# Patient Record
Sex: MALE | Race: WHITE | NOT HISPANIC OR LATINO | Employment: FULL TIME | URBAN - METROPOLITAN AREA
[De-identification: names, ages, dates, MRNs, and addresses within clinical notes are randomized per-mention and may not be internally consistent; named-entity substitution may affect disease eponyms.]

---

## 2018-01-28 ENCOUNTER — OFFICE VISIT (OUTPATIENT)
Dept: URGENT CARE | Facility: CLINIC | Age: 58
End: 2018-01-28
Payer: COMMERCIAL

## 2018-01-28 VITALS
SYSTOLIC BLOOD PRESSURE: 140 MMHG | OXYGEN SATURATION: 99 % | BODY MASS INDEX: 28.04 KG/M2 | HEIGHT: 68 IN | WEIGHT: 185 LBS | HEART RATE: 80 BPM | DIASTOLIC BLOOD PRESSURE: 82 MMHG | TEMPERATURE: 97.8 F | RESPIRATION RATE: 20 BRPM

## 2018-01-28 DIAGNOSIS — H60.501 ACUTE NON-INFECTIVE OTITIS EXTERNA OF RIGHT EAR, UNSPECIFIED TYPE: ICD-10-CM

## 2018-01-28 DIAGNOSIS — H61.21 IMPACTED CERUMEN OF RIGHT EAR: Primary | ICD-10-CM

## 2018-01-28 PROCEDURE — S9083 URGENT CARE CENTER GLOBAL: HCPCS | Performed by: FAMILY MEDICINE

## 2018-01-28 PROCEDURE — G0382 LEV 3 HOSP TYPE B ED VISIT: HCPCS | Performed by: FAMILY MEDICINE

## 2018-01-28 PROCEDURE — 69209 REMOVE IMPACTED EAR WAX UNI: CPT | Performed by: FAMILY MEDICINE

## 2018-01-28 RX ORDER — LISINOPRIL 40 MG/1
40 TABLET ORAL DAILY
COMMUNITY

## 2018-01-28 RX ORDER — ATORVASTATIN CALCIUM 20 MG/1
20 TABLET, FILM COATED ORAL DAILY
COMMUNITY

## 2018-01-28 RX ORDER — NEOMYCIN SULFATE, POLYMYXIN B SULFATE AND HYDROCORTISONE 10; 3.5; 1 MG/ML; MG/ML; [USP'U]/ML
4 SUSPENSION/ DROPS AURICULAR (OTIC) 4 TIMES DAILY
Qty: 10 ML | Refills: 0 | Status: SHIPPED | OUTPATIENT
Start: 2018-01-28

## 2018-01-28 NOTE — PROGRESS NOTES
[address] [place] [address] [address]     Patient Information: Tammy Aguilar  MRN: [id number] : 1960  Encounter: [id number]    HPI:  Patient presents with blockage of his right ear  He states he has decreased hearing and fullness sensation  He did try a Q-tip in his ear with minimal results  He denies any ear pain  He denies any fever chills nausea or vomiting  He was passing by so decided to stop in for a visit  Subjective:   Review of Systems   Constitutional: Negative  HENT: Negative  Negative for congestion, ear discharge, ear pain, hearing loss, rhinorrhea, sinus pain, sinus pressure, sore throat, tinnitus, trouble swallowing and voice change  Right ear blockage   Eyes: Negative  Respiratory: Negative  Cardiovascular: Negative  Gastrointestinal: Negative  Genitourinary: Negative  Musculoskeletal: Negative  Skin: Negative  Neurological: Negative  Hematological: Negative  Objective:  /82 (BP Location: Right arm, Patient Position: Sitting, Cuff Size: Standard)   Pulse 80   Temp 97 8 °F (36 6 °C) (Tympanic)   Resp 20   Ht 5' 8" (1 727 m)   Wt 83 9 kg (185 lb)   SpO2 99%   BMI 28 13 kg/m²   Physical Exam   Constitutional: He is oriented to person, place, and time  He appears well-developed  HENT:   Head: Normocephalic  Mouth/Throat: Oropharynx is clear and moist    Right EAC occluded with cerumen  After lavage there is evidence of otitis externa  TM is normal   Left EAC with scant cerumen  The ears irrigated with expulsion of the wax  TMs normal   Eyes: EOM are normal  Pupils are equal, round, and reactive to light  Neck: Normal range of motion  Neck supple  No thyromegaly present  Cardiovascular: Normal rate, regular rhythm, normal heart sounds and intact distal pulses  Pulmonary/Chest: Effort normal and breath sounds normal    Abdominal: Soft   Bowel sounds are normal  There is no tenderness  Musculoskeletal: Normal range of motion  Neurological: He is alert and oriented to person, place, and time  He has normal reflexes  Skin: Skin is warm and dry  Psychiatric: He has a normal mood and affect  Vitals reviewed  Ear cerumen removal  Date/Time: 1/28/2018 2:22 PM  Performed by: Presley Ramos  Authorized by: Presley Ramos     Patient location:  Clinic  Consent:     Consent obtained:  Verbal    Consent given by:  Patient    Risks discussed:  TM perforation and incomplete removal  Procedure details:     Location:  R ear    Procedure type: irrigation      Approach:  External  Post-procedure details:     Hearing quality:  Improved    Patient tolerance of procedure: Tolerated well, no immediate complications        Assessment:  Diagnoses and all orders for this visit:    Impacted cerumen of right ear    Acute non-infective otitis externa of right ear, unspecified type    Other orders  -     lisinopril (ZESTRIL) 40 mg tablet; Take 40 mg by mouth daily  -     atorvastatin (LIPITOR) 20 mg tablet; Take 20 mg by mouth daily        Plan:  Patient Instructions   Your ears were lavaged today with expulsion of wax  Use Cortisporin otic drops in the right ear as directed  May use Debrox once a month to help prevent wax buildup  Follow with your family doctor in 3-4 days if symptoms persist or worsen  Opal Greene DO  1/28/2018,2:19 PM    Portions of the record may have been created with voice recognition software   Occasional wrong word or "sound a like" substitutions may have occurred due to the inherent limitations of voice recognition software   Read the chart carefully and recognize, using context, where substitutions have occurred

## 2018-01-28 NOTE — PATIENT INSTRUCTIONS
Your ears were lavaged today with expulsion of wax  Use Cortisporin otic drops in the right ear as directed  May use Debrox once a month to help prevent wax buildup  Follow with your family doctor in 3-4 days if symptoms persist or worsen

## 2019-09-22 ENCOUNTER — OFFICE VISIT (OUTPATIENT)
Dept: URGENT CARE | Facility: CLINIC | Age: 59
End: 2019-09-22
Payer: COMMERCIAL

## 2019-09-22 VITALS
SYSTOLIC BLOOD PRESSURE: 143 MMHG | BODY MASS INDEX: 25.77 KG/M2 | OXYGEN SATURATION: 99 % | WEIGHT: 180 LBS | HEART RATE: 87 BPM | DIASTOLIC BLOOD PRESSURE: 77 MMHG | TEMPERATURE: 96.9 F | RESPIRATION RATE: 18 BRPM | HEIGHT: 70 IN

## 2019-09-22 DIAGNOSIS — R35.0 URINE FREQUENCY: ICD-10-CM

## 2019-09-22 DIAGNOSIS — N30.01 ACUTE CYSTITIS WITH HEMATURIA: Primary | ICD-10-CM

## 2019-09-22 LAB
SL AMB  POCT GLUCOSE, UA: ABNORMAL
SL AMB LEUKOCYTE ESTERASE,UA: ABNORMAL
SL AMB POCT BILIRUBIN,UA: ABNORMAL
SL AMB POCT BLOOD,UA: ABNORMAL
SL AMB POCT CLARITY,UA: ABNORMAL
SL AMB POCT COLOR,UA: YELLOW
SL AMB POCT KETONES,UA: ABNORMAL
SL AMB POCT NITRITE,UA: POSITIVE
SL AMB POCT PH,UA: 6
SL AMB POCT SPECIFIC GRAVITY,UA: 1.03
SL AMB POCT URINE PROTEIN: ABNORMAL
SL AMB POCT UROBILINOGEN: 1

## 2019-09-22 PROCEDURE — 99213 OFFICE O/P EST LOW 20 MIN: CPT | Performed by: FAMILY MEDICINE

## 2019-09-22 PROCEDURE — 81002 URINALYSIS NONAUTO W/O SCOPE: CPT | Performed by: FAMILY MEDICINE

## 2019-09-22 RX ORDER — CIPROFLOXACIN 500 MG/1
500 TABLET, FILM COATED ORAL EVERY 12 HOURS SCHEDULED
Qty: 14 TABLET | Refills: 0 | Status: SHIPPED | OUTPATIENT
Start: 2019-09-22 | End: 2019-09-29

## 2019-09-22 NOTE — PROGRESS NOTES
Assessment:      Acute cystitis      Plan:  Plan:      1  Medications: ciprofloxacin  2  Maintain adequate hydration  3  Follow up if symptoms not improving, and prn  Subjective:      Yvonne Green is a 60 y o  male who complains of abnormal smelling urine, dysuria, frequency and hematuria for 2 days  Patient also complains of fever  Patient denies back pain  Patient does not have a history of recurrent UTI  Patient does not have a history of pyelonephritis  The following portions of the patient's history were reviewed and updated as appropriate: allergies, current medications, past family history, past medical history, past social history, past surgical history and problem list     Review of Systems  Pertinent items are noted in HPI  Objective:      /77   Pulse 87   Temp (!) 96 9 °F (36 1 °C)   Resp 18   Ht 5' 10" (1 778 m)   Wt 81 6 kg (180 lb)   SpO2 99%   BMI 25 83 kg/m²   General: alert and oriented, in no acute distress   Abdomen: soft, non-tender, without masses or organomegaly and soft n   Back: CVA tenderness absent   : defer exam     Laboratory:   Urine dipstick shows noted in lab results       Micro exam: not done

## 2025-01-17 ENCOUNTER — DOCTOR'S OFFICE (OUTPATIENT)
Dept: URBAN - NONMETROPOLITAN AREA CLINIC 2 | Facility: CLINIC | Age: 65
Setting detail: OPHTHALMOLOGY
End: 2025-01-17
Payer: COMMERCIAL

## 2025-01-17 ENCOUNTER — RX ONLY (RX ONLY)
Age: 65
End: 2025-01-17

## 2025-01-17 DIAGNOSIS — H52.4: ICD-10-CM

## 2025-01-17 DIAGNOSIS — H52.13: ICD-10-CM

## 2025-01-17 PROBLEM — H25.013 CORTICAL CATARACT; BOTH EYES: Status: ACTIVE | Noted: 2025-01-17

## 2025-01-17 PROCEDURE — 92004 COMPRE OPH EXAM NEW PT 1/>: CPT

## 2025-01-17 PROCEDURE — 92015 DETERMINE REFRACTIVE STATE: CPT

## 2025-01-17 PROCEDURE — 92310 CONTACT LENS FITTING OU: CPT

## 2025-01-17 ASSESSMENT — REFRACTION_CURRENTRX
OD_ADD: +2.50
OS_SPHERE: -2.00
OD_CYLINDER: 0.00
OS_CYLINDER: 0.00
OS_AXIS: 000
OS_OVR_VA: 20/
OD_VPRISM_DIRECTION: PROGS
OD_SPHERE: -2.50
OD_AXIS: 000
OD_OVR_VA: 20/
OS_ADD: +2.50
OS_VPRISM_DIRECTION: PROGS

## 2025-01-17 ASSESSMENT — TONOMETRY
OD_IOP_MMHG: 17
OS_IOP_MMHG: 17

## 2025-01-17 ASSESSMENT — REFRACTION_MANIFEST
OS_ADD: +2.50
OD_VA2: 20/20
OD_SPHERE: -2.25
OS_VA1: 20/20
OS_VA2: 20/20
OD_ADD: +2.50
OD_CYLINDER: SPH
OS_CYLINDER: SPH
OU_VA: 20/20
OD_VA1: 20/20
OS_SPHERE: -2.00

## 2025-01-17 ASSESSMENT — REFRACTION_AUTOREFRACTION
OS_CYLINDER: -0.50
OD_SPHERE: -1.50
OS_AXIS: 017
OD_AXIS: 004
OD_CYLINDER: -0.25
OS_SPHERE: -1.50

## 2025-01-17 ASSESSMENT — CONFRONTATIONAL VISUAL FIELD TEST (CVF)
OS_FINDINGS: FULL
OD_FINDINGS: FULL

## 2025-01-17 ASSESSMENT — VISUAL ACUITY
OD_BCVA: 20/25-2
OS_BCVA: 20/20-2

## 2025-01-22 ENCOUNTER — OPTICAL OFFICE (OUTPATIENT)
Dept: URBAN - NONMETROPOLITAN AREA CLINIC 5 | Facility: CLINIC | Age: 65
Setting detail: OPHTHALMOLOGY
End: 2025-01-22

## 2025-01-22 DIAGNOSIS — H52.13: ICD-10-CM

## 2025-01-22 PROCEDURE — S0500 DISPOS CONT LENS: HCPCS | Mod: RT

## 2025-01-22 PROCEDURE — S0500 DISPOS CONT LENS: HCPCS | Mod: LT
